# Patient Record
Sex: MALE | Race: WHITE | NOT HISPANIC OR LATINO | Employment: FULL TIME | URBAN - METROPOLITAN AREA
[De-identification: names, ages, dates, MRNs, and addresses within clinical notes are randomized per-mention and may not be internally consistent; named-entity substitution may affect disease eponyms.]

---

## 2020-06-10 ENCOUNTER — OFFICE VISIT (OUTPATIENT)
Dept: ORTHOPEDIC SURGERY | Facility: CLINIC | Age: 40
End: 2020-06-10

## 2020-06-10 VITALS
HEART RATE: 72 BPM | HEIGHT: 70 IN | WEIGHT: 220 LBS | SYSTOLIC BLOOD PRESSURE: 137 MMHG | BODY MASS INDEX: 31.5 KG/M2 | DIASTOLIC BLOOD PRESSURE: 83 MMHG

## 2020-06-10 DIAGNOSIS — M25.561 ACUTE PAIN OF RIGHT KNEE: Primary | ICD-10-CM

## 2020-06-10 DIAGNOSIS — L03.115 CELLULITIS OF RIGHT LEG: ICD-10-CM

## 2020-06-10 PROCEDURE — 99203 OFFICE O/P NEW LOW 30 MIN: CPT | Performed by: ORTHOPAEDIC SURGERY

## 2020-06-10 RX ORDER — PANTOPRAZOLE SODIUM 40 MG/1
TABLET, DELAYED RELEASE ORAL
COMMUNITY
Start: 2020-05-03

## 2020-06-10 RX ORDER — CEPHALEXIN 500 MG/1
500 CAPSULE ORAL 3 TIMES DAILY
Qty: 30 CAPSULE | Refills: 0 | Status: SHIPPED | OUTPATIENT
Start: 2020-06-10 | End: 2020-06-20

## 2020-06-10 NOTE — PROGRESS NOTES
"     Patient ID: Carlos Ferrari is a 39 y.o. male.    Chief Complaint:    Chief Complaint   Patient presents with   • Right Knee - Consult       HPI:  Carlos is a 39-year-old gentleman here with several days of right knee pain after doing a lot of kneeling on some gravel.  He developed some redness and pain over the prepatellar bursa.  Pain is improved but redness continues.  Denies fevers.  Denies wound drainage.  Pain is dull and a 3/10  History reviewed. No pertinent past medical history.    History reviewed. No pertinent surgical history.    History reviewed. No pertinent family history.       Social History     Occupational History   • Not on file   Tobacco Use   • Smoking status: Never Smoker   Substance and Sexual Activity   • Alcohol use: Never     Frequency: Never   • Drug use: Not on file   • Sexual activity: Not on file      Review of Systems   Cardiovascular: Negative for chest pain.   Musculoskeletal: Positive for arthralgias.       Objective:    /83   Pulse 72   Ht 177.8 cm (70\")   Wt 99.8 kg (220 lb)   BMI 31.57 kg/m²     Physical Examination:  He is a pleasant male in no distress. He is alert and oriented x3 and appears his stated age.  Right knee demonstrates no scars and no atrophy.  He has a mild amount of swelling in the prepatellar bursa.  Mild redness over the prepatellar bursa extending down to the mid calf.  Calf is soft.Sensory and motor exam are intact all distributions. Dorsalis pedis and posterior tibialis pulses are palpable and capillary refill is less than two seconds to all digits    Imaging:      Assessment:  Right knee prepatellar bursitis with cellulitis    Plan:  I recommend an antibiotic for the next 10 days, contact me if symptoms worsen.  Recommend knee protection in the future, see me as needed          Disclaimer: Please note that areas of this note were completed with computer voice recognition software.  Quite often unanticipated grammatical, syntax, homophones, " and other interpretive errors are inadvertently transcribed by the computer software. Please excuse any errors that have escaped final proofreading.